# Patient Record
Sex: MALE | Race: WHITE | NOT HISPANIC OR LATINO
[De-identification: names, ages, dates, MRNs, and addresses within clinical notes are randomized per-mention and may not be internally consistent; named-entity substitution may affect disease eponyms.]

---

## 2023-06-27 ENCOUNTER — APPOINTMENT (OUTPATIENT)
Dept: ORTHOPEDIC SURGERY | Facility: CLINIC | Age: 68
End: 2023-06-27
Payer: MEDICARE

## 2023-06-27 VITALS — HEIGHT: 74 IN | WEIGHT: 135 LBS | BODY MASS INDEX: 17.32 KG/M2

## 2023-06-27 DIAGNOSIS — Z98.890 OTHER SPECIFIED POSTPROCEDURAL STATES: ICD-10-CM

## 2023-06-27 DIAGNOSIS — Z78.9 OTHER SPECIFIED HEALTH STATUS: ICD-10-CM

## 2023-06-27 PROCEDURE — 73660 X-RAY EXAM OF TOE(S): CPT | Mod: 50

## 2023-06-27 PROCEDURE — 99214 OFFICE O/P EST MOD 30 MIN: CPT

## 2023-06-27 PROCEDURE — 99204 OFFICE O/P NEW MOD 45 MIN: CPT

## 2023-06-27 NOTE — DISCUSSION/SUMMARY
[Surgical risks reviewed] : Surgical risks reviewed [de-identified] : The risks and benefits of surgery have been discussed. Risks include but are not limited to bleeding, infection, reaction to anesthesia, injury to blood vessels and nerves, malunion, nonunion, necessity of repeat surgery, chronic pain, loss of limb and death. The patient understands the risks and agrees with the surgical plan. Details of the procedure and postoperative protocol were discussed at length. All questions have been answered.\par \par Discussed right foot 2nd hammertoe correction\par unhappy with his prior MIS results

## 2023-06-27 NOTE — HISTORY OF PRESENT ILLNESS
[2] : 2 [0] : 0 [Localized] : localized [de-identified] : 6/27/23  DAI NICOLE a 68 year old male here for evaluation of b/l feet. Patient states he had hammer 2nd toe surgery b/l feet with a podiatrist (MIS) right side in 7/2022 and left side in 8/2022 . Pt states his toes "does not feel right" with sensitivity with wearing socks. Denies trauma/previous injury. No numbness/tingling. WB in sneakers. Wants to proceed with surgery, and with do in a staged procedure with the right side first.  [FreeTextEntry1] : B/l feet

## 2023-06-27 NOTE — PHYSICAL EXAM
[Bilateral] : foot and ankle bilaterally [NL (20)] : dorsiflexion 20 degrees [NL (40)] : plantar flexion 40 degrees [5___] : plantar flexion 5[unfilled]/5 [2+] : dorsalis pedis pulse: 2+ [Mild] : mild swelling of toe(s) [2nd] : 2nd [] : patient ambulates without assistive device

## 2023-07-17 ENCOUNTER — APPOINTMENT (OUTPATIENT)
Age: 68
End: 2023-07-17

## 2023-07-28 ENCOUNTER — APPOINTMENT (OUTPATIENT)
Dept: ORTHOPEDIC SURGERY | Facility: CLINIC | Age: 68
End: 2023-07-28

## 2024-05-14 ENCOUNTER — APPOINTMENT (OUTPATIENT)
Dept: ORTHOPEDIC SURGERY | Facility: CLINIC | Age: 69
End: 2024-05-14
Payer: MEDICARE

## 2024-05-14 VITALS — WEIGHT: 240 LBS | HEIGHT: 74 IN | BODY MASS INDEX: 30.8 KG/M2

## 2024-05-14 DIAGNOSIS — Z00.00 ENCOUNTER FOR GENERAL ADULT MEDICAL EXAMINATION W/OUT ABNORMAL FINDINGS: ICD-10-CM

## 2024-05-14 PROCEDURE — 99214 OFFICE O/P EST MOD 30 MIN: CPT

## 2024-05-14 PROCEDURE — 73630 X-RAY EXAM OF FOOT: CPT | Mod: 50

## 2024-05-14 NOTE — PHYSICAL EXAM
[Bilateral] : foot and ankle bilaterally [2nd] : 2nd [NL (40)] : plantar flexion 40 degrees [NL 30)] : inversion 30 degrees [NL (20)] : eversion 20 degrees [5___] : eversion 5[unfilled]/5 [2+] : dorsalis pedis pulse: 2+ [] : negative anterior drawer at ankle [de-identified] : pain on mtp stress

## 2024-05-14 NOTE — ASSESSMENT
[FreeTextEntry1] : recurrent hammertoe with mtp instability discussed surgical and non surgical options discussed pros/cons and expected recovery patient with radiating pain up leg -- advised unlikely surgery would change any neuroloic dysfunction from his first surgery. goal of surgery would be to correct deformity and alleviate mechanical pain all questions answered and would like to proceed with L foot revision 2nd toe reconstruction with metatarsal osteotomy  The pros, cons, risks, benefits, and alternatives have been discussed.  The risks include but are not limited to infection, bleeding, injury to small nerves and blood vessels, pain, stiffness, progression, dvt, PE, amputation and death. Expected recovery time was discussed. All the patient's questions were answered and they would like to proceed.

## 2024-05-17 ENCOUNTER — APPOINTMENT (OUTPATIENT)
Dept: ORTHOPEDIC SURGERY | Facility: CLINIC | Age: 69
End: 2024-05-17

## 2024-05-28 ENCOUNTER — APPOINTMENT (OUTPATIENT)
Dept: ORTHOPEDIC SURGERY | Facility: CLINIC | Age: 69
End: 2024-05-28
Payer: MEDICARE

## 2024-05-28 VITALS — WEIGHT: 240 LBS | HEIGHT: 74 IN | BODY MASS INDEX: 30.8 KG/M2

## 2024-05-28 DIAGNOSIS — M20.42 OTHER HAMMER TOE(S) (ACQUIRED), LEFT FOOT: ICD-10-CM

## 2024-05-28 DIAGNOSIS — M20.41 OTHER HAMMER TOE(S) (ACQUIRED), RIGHT FOOT: ICD-10-CM

## 2024-05-28 PROCEDURE — 99214 OFFICE O/P EST MOD 30 MIN: CPT | Mod: 25

## 2024-05-28 PROCEDURE — 20600 DRAIN/INJ JOINT/BURSA W/O US: CPT | Mod: RT

## 2024-05-28 NOTE — PROCEDURE
[Small Joint Injection] : small joint injection [Right] : of the right [2nd] : 2nd [Metatarsophalangeal joint] : metatarsophalangeal joint [Pain] : pain [Betadine] : betadine [Ethyl Chloride sprayed topically] : ethyl chloride sprayed topically [Sterile technique used] : sterile technique used [___ cc    3mg] :  Betamethasone (Celestone) ~Vcc of 3mg [___ cc    1%] : Lidocaine ~Vcc of 1%  [] : Patient tolerated procedure well [Call if redness, pain or fever occur] : call if redness, pain or fever occur [Apply ice for 15min out of every hour for the next 12-24 hours as tolerated] : apply ice for 15 minutes out of every hour for the next 12-24 hours as tolerated [Patient had decreased mobility in the joint] : patient had decreased mobility in the joint [Risks, benefits, alternatives discussed / Verbal consent obtained] : the risks benefits, and alternatives have been discussed, and verbal consent was obtained

## 2024-05-28 NOTE — PHYSICAL EXAM
[Bilateral] : foot and ankle bilaterally [2nd] : 2nd [NL (40)] : plantar flexion 40 degrees [NL 30)] : inversion 30 degrees [NL (20)] : eversion 20 degrees [5___] : eversion 5[unfilled]/5 [2+] : dorsalis pedis pulse: 2+ [] : negative anterior drawer at ankle [de-identified] : pain on mtp stress

## 2024-05-28 NOTE — ASSESSMENT
[FreeTextEntry1] : recurrent hammertoe with mtp instability discussed surgical and non surgical options discussed pros/cons and expected recovery patient with radiating pain up leg -- advised unlikely surgery would change any neuroloic dysfunction from his first surgery. goal of surgery would be to correct deformity and alleviate mechanical pain all questions answered and would like to proceed with L foot revision 2nd toe reconstruction with metatarsal osteotomy  patient would like csi R foot today  The pros, cons, risks, benefits, and alternatives have been discussed.  The risks include but are not limited to infection, bleeding, injury to small nerves and blood vessels, pain, stiffness, progression, dvt, PE, amputation and death. Expected recovery time was discussed. All the patient's questions were answered and they would like to proceed.

## 2024-05-28 NOTE — HISTORY OF PRESENT ILLNESS
[Sharp] : sharp [de-identified] : 05/14/2024: 69 M is here for bilateral foot pain. prior 2nd hammertoe surgery in 2022 w/ dpm with ongoing pain. denies post op infection. having ongoing pain. denies dm/tob. no recent injury/treatment. c/o ongoing nerve pain -- saw neurologist in [place] who tx w/ pregabalin w/o relief.  05/28/2024: no sig change. walking in regular shoes. [] : Post Surgical Visit: no [FreeTextEntry1] : bilateral foot

## 2024-05-28 NOTE — IMAGING
yes [Bilateral] : foot bilaterally [Weight -] : weightbearing [de-identified] : recurrent hammertoe

## 2024-05-30 ENCOUNTER — TRANSCRIPTION ENCOUNTER (OUTPATIENT)
Age: 69
End: 2024-05-30

## 2024-07-29 ENCOUNTER — OUTPATIENT (OUTPATIENT)
Dept: OUTPATIENT SERVICES | Facility: HOSPITAL | Age: 69
LOS: 1 days | End: 2024-07-29
Payer: MEDICARE

## 2024-07-29 VITALS
WEIGHT: 238.98 LBS | RESPIRATION RATE: 14 BRPM | SYSTOLIC BLOOD PRESSURE: 127 MMHG | OXYGEN SATURATION: 96 % | HEIGHT: 73.75 IN | DIASTOLIC BLOOD PRESSURE: 84 MMHG | HEART RATE: 78 BPM | TEMPERATURE: 98 F

## 2024-07-29 DIAGNOSIS — M20.42 OTHER HAMMER TOE(S) (ACQUIRED), LEFT FOOT: ICD-10-CM

## 2024-07-29 DIAGNOSIS — Z98.41 CATARACT EXTRACTION STATUS, RIGHT EYE: Chronic | ICD-10-CM

## 2024-07-29 DIAGNOSIS — Z98.890 OTHER SPECIFIED POSTPROCEDURAL STATES: Chronic | ICD-10-CM

## 2024-07-29 DIAGNOSIS — Z01.818 ENCOUNTER FOR OTHER PREPROCEDURAL EXAMINATION: ICD-10-CM

## 2024-07-29 DIAGNOSIS — Z98.42 CATARACT EXTRACTION STATUS, LEFT EYE: Chronic | ICD-10-CM

## 2024-07-29 LAB
ANION GAP SERPL CALC-SCNC: 6 MMOL/L — SIGNIFICANT CHANGE UP (ref 5–17)
BUN SERPL-MCNC: 22 MG/DL — SIGNIFICANT CHANGE UP (ref 7–23)
CALCIUM SERPL-MCNC: 9.1 MG/DL — SIGNIFICANT CHANGE UP (ref 8.4–10.5)
CHLORIDE SERPL-SCNC: 109 MMOL/L — HIGH (ref 96–108)
CO2 SERPL-SCNC: 27 MMOL/L — SIGNIFICANT CHANGE UP (ref 22–31)
CREAT SERPL-MCNC: 1 MG/DL — SIGNIFICANT CHANGE UP (ref 0.5–1.3)
EGFR: 81 ML/MIN/1.73M2 — SIGNIFICANT CHANGE UP
GLUCOSE SERPL-MCNC: 110 MG/DL — HIGH (ref 70–99)
HCT VFR BLD CALC: 45.3 % — SIGNIFICANT CHANGE UP (ref 39–50)
HGB BLD-MCNC: 14.8 G/DL — SIGNIFICANT CHANGE UP (ref 13–17)
MCHC RBC-ENTMCNC: 28.6 PG — SIGNIFICANT CHANGE UP (ref 27–34)
MCHC RBC-ENTMCNC: 32.7 GM/DL — SIGNIFICANT CHANGE UP (ref 32–36)
MCV RBC AUTO: 87.6 FL — SIGNIFICANT CHANGE UP (ref 80–100)
NRBC # BLD: 0 /100 WBCS — SIGNIFICANT CHANGE UP (ref 0–0)
PLATELET # BLD AUTO: 221 K/UL — SIGNIFICANT CHANGE UP (ref 150–400)
POTASSIUM SERPL-MCNC: 4 MMOL/L — SIGNIFICANT CHANGE UP (ref 3.5–5.3)
POTASSIUM SERPL-SCNC: 4 MMOL/L — SIGNIFICANT CHANGE UP (ref 3.5–5.3)
RBC # BLD: 5.17 M/UL — SIGNIFICANT CHANGE UP (ref 4.2–5.8)
RBC # FLD: 13.3 % — SIGNIFICANT CHANGE UP (ref 10.3–14.5)
SODIUM SERPL-SCNC: 142 MMOL/L — SIGNIFICANT CHANGE UP (ref 135–145)
WBC # BLD: 7.04 K/UL — SIGNIFICANT CHANGE UP (ref 3.8–10.5)
WBC # FLD AUTO: 7.04 K/UL — SIGNIFICANT CHANGE UP (ref 3.8–10.5)

## 2024-07-29 PROCEDURE — 36415 COLL VENOUS BLD VENIPUNCTURE: CPT

## 2024-07-29 PROCEDURE — 80048 BASIC METABOLIC PNL TOTAL CA: CPT

## 2024-07-29 PROCEDURE — 85027 COMPLETE CBC AUTOMATED: CPT

## 2024-07-29 PROCEDURE — G0463: CPT

## 2024-07-29 NOTE — H&P PST ADULT - NSICDXPASTSURGICALHX_GEN_ALL_CORE_FT
PAST SURGICAL HISTORY:  History of bilateral inguinal hernia repair     History of left cataract extraction     History of right cataract extraction

## 2024-07-29 NOTE — H&P PST ADULT - NSICDXFAMILYHX_GEN_ALL_CORE_FT
FAMILY HISTORY:  Father  Still living? No  Family history of hypertension, Age at diagnosis: Age Unknown  Family history of stroke, Age at diagnosis: Age Unknown    Mother  Still living? No  Family history of COPD (chronic obstructive pulmonary disease), Age at diagnosis: Age Unknown

## 2024-07-29 NOTE — H&P PST ADULT - COMMENTS
history of covid 02/22 flu like symptoms and 12/23 mild symptoms and no treatment  denies any current cold or flu like symptoms, including fever, cough, sinus congestion, body aches or chills

## 2024-07-29 NOTE — H&P PST ADULT - NSANTHOSAYNRD_GEN_A_CORE
neck 18 inches/No. MAURICE screening performed.  STOP BANG Legend: 0-2 = LOW Risk; 3-4 = INTERMEDIATE Risk; 5-8 = HIGH Risk

## 2024-07-29 NOTE — H&P PST ADULT - MUSCULOSKELETAL
bilateral second hammer toe/normal/ROM intact/no joint swelling/no joint erythema/no joint warmth/no calf tenderness/normal gait/strength 5/5 bilateral upper extremities/strength 5/5 bilateral lower extremities details…

## 2024-07-29 NOTE — H&P PST ADULT - NSICDXPASTMEDICALHX_GEN_ALL_CORE_FT
PAST MEDICAL HISTORY:  Abnormal EKG     COVID-19 vaccine series completed     Dyslipidemia     Gout     Hammer toe of left foot     Hammer toe of right foot     History of COVID-19     Hypertension     Low left ventricular ejection fraction     Osteoarthritis

## 2024-07-29 NOTE — H&P PST ADULT - PROBLEM SELECTOR PLAN 1
left foot second toe reconstruction. medical and cardiac clearances requested. surgical wash instructions reviewed and verbalized understanding. instructed to stop ubiquinol and aspirin 1 week prior to surgery. NPO after midnight

## 2024-07-29 NOTE — H&P PST ADULT - HISTORY OF PRESENT ILLNESS
70 yo male presents s/p bilateral second hammer toe repair 2 years ago and states that since then reports pain in the second toe bilaterally with radiation up the foot. Pain 4-5/10. He received cortisone injections with no relief. No relief with TENs unit

## 2024-08-15 ENCOUNTER — TRANSCRIPTION ENCOUNTER (OUTPATIENT)
Age: 69
End: 2024-08-15

## 2024-08-16 ENCOUNTER — TRANSCRIPTION ENCOUNTER (OUTPATIENT)
Age: 69
End: 2024-08-16

## 2024-08-16 ENCOUNTER — APPOINTMENT (OUTPATIENT)
Dept: ORTHOPEDIC SURGERY | Facility: HOSPITAL | Age: 69
End: 2024-08-16
Payer: MEDICARE

## 2024-08-16 ENCOUNTER — RESULT REVIEW (OUTPATIENT)
Age: 69
End: 2024-08-16

## 2024-08-16 PROCEDURE — 28270 RELEASE OF FOOT CONTRACTURE: CPT | Mod: 59,LT

## 2024-08-16 PROCEDURE — 28234 INCISION OF FOOT TENDON: CPT | Mod: 59,LT

## 2024-08-16 PROCEDURE — 28230 INCISION OF FOOT TENDON(S): CPT | Mod: 59,LT

## 2024-08-16 PROCEDURE — 28313 REPAIR DEFORMITY OF TOE: CPT | Mod: 59,T1

## 2024-08-16 PROCEDURE — 73630 X-RAY EXAM OF FOOT: CPT | Mod: 26,LT

## 2024-08-16 PROCEDURE — 28308 INCISION OF METATARSAL: CPT | Mod: LT

## 2024-08-16 PROCEDURE — 28160 PARTIAL REMOVAL OF TOE: CPT | Mod: T1,59

## 2024-08-16 PROCEDURE — 28208 REPAIR OF FOOT TENDON: CPT | Mod: 59,LT

## 2024-08-16 RX ORDER — ALLOPURINOL 100 MG/1
1 TABLET ORAL
Refills: 0 | DISCHARGE

## 2024-08-16 RX ORDER — LIPOSOMAL UBIQUINOL 100 MG/ML
1 LIQUID (ML) ORAL
Refills: 0 | DISCHARGE

## 2024-08-16 RX ORDER — METOPROLOL TARTRATE 100 MG
1 TABLET ORAL
Refills: 0 | DISCHARGE

## 2024-08-16 RX ORDER — VALSARTAN 40 MG/1
1 TABLET, FILM COATED ORAL
Refills: 0 | DISCHARGE

## 2024-08-16 RX ORDER — ASPIRIN 500 MG
0 TABLET ORAL
Refills: 0 | DISCHARGE

## 2024-08-22 ENCOUNTER — APPOINTMENT (OUTPATIENT)
Dept: ORTHOPEDIC SURGERY | Facility: CLINIC | Age: 69
End: 2024-08-22
Payer: MEDICARE

## 2024-08-22 DIAGNOSIS — M20.42 OTHER HAMMER TOE(S) (ACQUIRED), LEFT FOOT: ICD-10-CM

## 2024-08-22 PROBLEM — M20.41 OTHER HAMMER TOE(S) (ACQUIRED), RIGHT FOOT: Chronic | Status: ACTIVE | Noted: 2024-07-29

## 2024-08-22 PROBLEM — Z92.29 PERSONAL HISTORY OF OTHER DRUG THERAPY: Chronic | Status: ACTIVE | Noted: 2024-07-29

## 2024-08-22 PROBLEM — I10 ESSENTIAL (PRIMARY) HYPERTENSION: Chronic | Status: ACTIVE | Noted: 2024-07-29

## 2024-08-22 PROBLEM — M19.90 UNSPECIFIED OSTEOARTHRITIS, UNSPECIFIED SITE: Chronic | Status: ACTIVE | Noted: 2024-07-29

## 2024-08-22 PROBLEM — R94.30 ABNORMAL RESULT OF CARDIOVASCULAR FUNCTION STUDY, UNSPECIFIED: Chronic | Status: ACTIVE | Noted: 2024-07-29

## 2024-08-22 PROBLEM — R94.31 ABNORMAL ELECTROCARDIOGRAM [ECG] [EKG]: Chronic | Status: ACTIVE | Noted: 2024-07-29

## 2024-08-22 PROBLEM — Z86.16 PERSONAL HISTORY OF COVID-19: Chronic | Status: ACTIVE | Noted: 2024-07-29

## 2024-08-22 PROBLEM — M10.9 GOUT, UNSPECIFIED: Chronic | Status: ACTIVE | Noted: 2024-07-29

## 2024-08-22 PROBLEM — E78.5 HYPERLIPIDEMIA, UNSPECIFIED: Chronic | Status: ACTIVE | Noted: 2024-07-29

## 2024-08-22 PROCEDURE — 99024 POSTOP FOLLOW-UP VISIT: CPT

## 2024-08-22 NOTE — PHYSICAL EXAM
[Left] : left foot and ankle [5___] : plantar flexion 5[unfilled]/5 [2+] : dorsalis pedis pulse: 2+ [] : patient ambulates without assistive device [FreeTextEntry3] : alignment maintained

## 2024-08-22 NOTE — HISTORY OF PRESENT ILLNESS
[Sharp] : sharp [de-identified] : 05/14/2024: 69 M is here for bilateral foot pain. prior 2nd hammertoe surgery in 2022 w/ dpm with ongoing pain. denies post op infection. having ongoing pain. denies dm/tob. no recent injury/treatment. c/o ongoing nerve pain -- saw neurologist in [place] who tx w/ pregabalin w/o relief.  05/28/2024: no sig change. walking in regular shoes.  8/16/2024: left foot 2nd toe reconstruction   8/22/2024: no complaints. walking in post op shoe -- not complying w/ wb precautions. Patient denies fevers, chills, or drainage. [] : Post Surgical Visit: no [FreeTextEntry1] : bilateral foot

## 2024-08-22 NOTE — HISTORY OF PRESENT ILLNESS
[Sharp] : sharp [de-identified] : 05/14/2024: 69 M is here for bilateral foot pain. prior 2nd hammertoe surgery in 2022 w/ dpm with ongoing pain. denies post op infection. having ongoing pain. denies dm/tob. no recent injury/treatment. c/o ongoing nerve pain -- saw neurologist in [place] who tx w/ pregabalin w/o relief.  05/28/2024: no sig change. walking in regular shoes.  8/16/2024: left foot 2nd toe reconstruction   8/22/2024: no complaints. walking in post op shoe -- not complying w/ wb precautions. Patient denies fevers, chills, or drainage. [] : Post Surgical Visit: no [FreeTextEntry1] : bilateral foot

## 2024-08-22 NOTE — ASSESSMENT
[FreeTextEntry1] : reiterated precautions -- heel wb with step to gait in post op shoe advised no weight on forefoot new dressing applied to stabilize the toe -- keep clean dry taking asa elevate tylenol prn f/up 2 wks w/ foot xray and suture removal

## 2024-09-03 ENCOUNTER — APPOINTMENT (OUTPATIENT)
Dept: ORTHOPEDIC SURGERY | Facility: CLINIC | Age: 69
End: 2024-09-03

## 2024-09-03 PROCEDURE — 29550 STRAPPING OF TOES: CPT | Mod: 58,LT

## 2024-09-03 PROCEDURE — 99024 POSTOP FOLLOW-UP VISIT: CPT

## 2024-09-03 PROCEDURE — 73630 X-RAY EXAM OF FOOT: CPT | Mod: LT

## 2024-09-03 NOTE — PHYSICAL EXAM
[Left] : left foot and ankle [5___] : plantar flexion 5[unfilled]/5 [2+] : dorsalis pedis pulse: 2+ [] : no calf tenderness [FreeTextEntry3] : alignment maintained

## 2024-09-03 NOTE — HISTORY OF PRESENT ILLNESS
[Sharp] : sharp [de-identified] : 05/14/2024: 69 M is here for bilateral foot pain. prior 2nd hammertoe surgery in 2022 w/ dpm with ongoing pain. denies post op infection. having ongoing pain. denies dm/tob. no recent injury/treatment. c/o ongoing nerve pain -- saw neurologist in [place] who tx w/ pregabalin w/o relief.  05/28/2024: no sig change. walking in regular shoes.  8/16/2024: left foot 2nd toe reconstruction   8/22/2024: no complaints. walking in post op shoe -- not complying w/ wb precautions. Patient denies fevers, chills, or drainage.  09/03/2024:  remains walking in post op shoe. Patient denies fevers, chills, or drainage. [] : Post Surgical Visit: no [FreeTextEntry1] : bilateral foot

## 2024-09-03 NOTE — HISTORY OF PRESENT ILLNESS
[Sharp] : sharp [de-identified] : 05/14/2024: 69 M is here for bilateral foot pain. prior 2nd hammertoe surgery in 2022 w/ dpm with ongoing pain. denies post op infection. having ongoing pain. denies dm/tob. no recent injury/treatment. c/o ongoing nerve pain -- saw neurologist in [place] who tx w/ pregabalin w/o relief.  05/28/2024: no sig change. walking in regular shoes.  8/16/2024: left foot 2nd toe reconstruction   8/22/2024: no complaints. walking in post op shoe -- not complying w/ wb precautions. Patient denies fevers, chills, or drainage.  09/03/2024:  remains walking in post op shoe. Patient denies fevers, chills, or drainage. [] : Post Surgical Visit: no [FreeTextEntry1] : bilateral foot

## 2024-09-03 NOTE — ASSESSMENT
[FreeTextEntry1] : reiterated precautions -- heel wb with step to gait in post op shoe advised no weight on forefoot toe strapping demonstrated discussed loss of implant fixation. discussed alignment maintained. discussed revision procedure vs continued observation to determine if implant/pip joint becomes symptomatic. discussed can remove implant/revise joint at later date as well if symptoms develop. also discussed may need need procedure if alignment maintained and no symptoms all questions answered. patient in agreement w/ plan. will observe for now. f/up 1 wk w/ foot xray to eval for further displacement f/up 1 wk w/ foot xray

## 2024-09-10 ENCOUNTER — APPOINTMENT (OUTPATIENT)
Dept: ORTHOPEDIC SURGERY | Facility: CLINIC | Age: 69
End: 2024-09-10
Payer: MEDICARE

## 2024-09-10 DIAGNOSIS — M20.42 OTHER HAMMER TOE(S) (ACQUIRED), LEFT FOOT: ICD-10-CM

## 2024-09-10 PROCEDURE — 73630 X-RAY EXAM OF FOOT: CPT | Mod: LT

## 2024-09-10 PROCEDURE — 99024 POSTOP FOLLOW-UP VISIT: CPT

## 2024-09-10 NOTE — PHYSICAL EXAM
[Left] : left foot and ankle [5___] : plantar flexion 5[unfilled]/5 [2+] : dorsalis pedis pulse: 2+ [] : no calf tenderness [FreeTextEntry3] : alignment maintained [FreeTextEntry8] : no pain at pip joint [de-identified] : dec at baseline

## 2024-09-10 NOTE — ASSESSMENT
[FreeTextEntry1] : reiterated precautions -- heel wb with step to gait in post op shoe advised no weight on forefoot toe strapping demonstrated discussed loss of implant fixation. discussed revision vs observation. discussed proc/cons. as no pain and alignment maintained, patient would like to avoid revision if possible. will continue to monitor. discussed revision if moves further or begins to become symptomatic f/up 2 wks w/ foot xray

## 2024-09-10 NOTE — HISTORY OF PRESENT ILLNESS
[Sharp] : sharp [de-identified] : 05/14/2024: 69 M is here for bilateral foot pain. prior 2nd hammertoe surgery in 2022 w/ dpm with ongoing pain. denies post op infection. having ongoing pain. denies dm/tob. no recent injury/treatment. c/o ongoing nerve pain -- saw neurologist in [place] who tx w/ pregabalin w/o relief.  05/28/2024: no sig change. walking in regular shoes.  8/16/2024: left foot 2nd toe reconstruction   8/22/2024: no complaints. walking in post op shoe -- not complying w/ wb precautions. Patient denies fevers, chills, or drainage.  09/03/2024:  remains walking in post op shoe. Patient denies fevers, chills, or drainage.  09/10/2024:  walking in post op shoe.  Denies f/c/drainage.  no new issues [] : Post Surgical Visit: no [FreeTextEntry1] : bilateral foot

## 2024-09-10 NOTE — IMAGING
[Left] : left foot [de-identified] : loss of fixation of pip implant w/ joint distraction --unchanged from prior. tip of implant remains in prox phx

## 2024-09-24 ENCOUNTER — APPOINTMENT (OUTPATIENT)
Dept: ORTHOPEDIC SURGERY | Facility: CLINIC | Age: 69
End: 2024-09-24
Payer: MEDICARE

## 2024-09-24 DIAGNOSIS — M20.42 OTHER HAMMER TOE(S) (ACQUIRED), LEFT FOOT: ICD-10-CM

## 2024-09-24 PROCEDURE — 73630 X-RAY EXAM OF FOOT: CPT | Mod: LT

## 2024-09-24 PROCEDURE — 99024 POSTOP FOLLOW-UP VISIT: CPT

## 2024-09-24 NOTE — IMAGING
[Left] : left foot [de-identified] : loss of fixation of pip implant w/ joint distraction --unchanged from prior. tip of implant remains in prox phx

## 2024-09-24 NOTE — HISTORY OF PRESENT ILLNESS
[Sharp] : sharp [de-identified] : 05/14/2024: 69 M is here for bilateral foot pain. prior 2nd hammertoe surgery in 2022 w/ dpm with ongoing pain. denies post op infection. having ongoing pain. denies dm/tob. no recent injury/treatment. c/o ongoing nerve pain -- saw neurologist in [place] who tx w/ pregabalin w/o relief.  05/28/2024: no sig change. walking in regular shoes.  8/16/2024: left foot 2nd toe reconstruction   8/22/2024: no complaints. walking in post op shoe -- not complying w/ wb precautions. Patient denies fevers, chills, or drainage.  09/03/2024:  remains walking in post op shoe. Patient denies fevers, chills, or drainage.  09/10/2024:  walking in post op shoe.  Denies f/c/drainage.  no new issues  09/24/2024: no change. walking w/ full weight in sneakers. Patient denies fevers, chills, or drainage. has not been taping. saw pain mgmt -- now on gabapentin [] : Post Surgical Visit: no [FreeTextEntry1] : bilateral foot

## 2024-09-24 NOTE — PHYSICAL EXAM
[Left] : left foot and ankle [5___] : plantar flexion 5[unfilled]/5 [2+] : dorsalis pedis pulse: 2+ [] : no calf tenderness [FreeTextEntry3] : alignment maintained [FreeTextEntry8] : no pain at pip joint [de-identified] : dec at baseline

## 2024-09-24 NOTE — ASSESSMENT
[FreeTextEntry1] : wbat reiterated taping discussed loss of implant fixation. discussed revision vs observation. discussed proc/cons. as no pain and alignment maintained, patient would like to avoid revision if possible. will continue to monitor. discussed revision if moves further or begins to become symptomatic. will continue to monitor  f/up 4 wks w/ foot xray (when returns from [place])

## 2024-10-28 ENCOUNTER — APPOINTMENT (OUTPATIENT)
Dept: ORTHOPEDIC SURGERY | Facility: CLINIC | Age: 69
End: 2024-10-28
Payer: MEDICARE

## 2024-10-28 DIAGNOSIS — M20.42 OTHER HAMMER TOE(S) (ACQUIRED), LEFT FOOT: ICD-10-CM

## 2024-10-28 PROCEDURE — 73630 X-RAY EXAM OF FOOT: CPT | Mod: LT

## 2024-10-28 PROCEDURE — 99024 POSTOP FOLLOW-UP VISIT: CPT

## 2025-06-23 ENCOUNTER — APPOINTMENT (OUTPATIENT)
Dept: ORTHOPEDIC SURGERY | Facility: CLINIC | Age: 70
End: 2025-06-23
Payer: MEDICARE

## 2025-06-23 ENCOUNTER — NON-APPOINTMENT (OUTPATIENT)
Age: 70
End: 2025-06-23

## 2025-06-23 VITALS — HEIGHT: 74 IN | BODY MASS INDEX: 32.08 KG/M2 | WEIGHT: 250 LBS

## 2025-06-23 PROCEDURE — G2211 COMPLEX E/M VISIT ADD ON: CPT

## 2025-06-23 PROCEDURE — 73564 X-RAY EXAM KNEE 4 OR MORE: CPT | Mod: 50

## 2025-06-23 PROCEDURE — 99203 OFFICE O/P NEW LOW 30 MIN: CPT
